# Patient Record
Sex: FEMALE | Race: OTHER | HISPANIC OR LATINO
[De-identification: names, ages, dates, MRNs, and addresses within clinical notes are randomized per-mention and may not be internally consistent; named-entity substitution may affect disease eponyms.]

---

## 2020-11-19 ENCOUNTER — APPOINTMENT (OUTPATIENT)
Dept: SURGERY | Facility: CLINIC | Age: 25
End: 2020-11-19
Payer: MEDICAID

## 2020-11-19 VITALS
TEMPERATURE: 97.7 F | BODY MASS INDEX: 30.97 KG/M2 | OXYGEN SATURATION: 99 % | HEART RATE: 83 BPM | WEIGHT: 177 LBS | DIASTOLIC BLOOD PRESSURE: 71 MMHG | HEIGHT: 63.5 IN | SYSTOLIC BLOOD PRESSURE: 115 MMHG

## 2020-11-19 DIAGNOSIS — K40.90 UNILATERAL INGUINAL HERNIA, W/OUT OBSTRUCTION OR GANGRENE, NOT SPECIFIED AS RECURRENT: ICD-10-CM

## 2020-11-19 PROCEDURE — 99204 OFFICE O/P NEW MOD 45 MIN: CPT

## 2020-11-19 NOTE — PHYSICAL EXAM
[JVD] : no jugular venous distention  [de-identified] : No acute distress [de-identified] : No respiratory distress [de-identified] : Regular rate [de-identified] : abdomen soft. no palpable inguinal hernia defect on exam, mild discomfort on palpation in right lower quadrant but no focal tenderness, no rebound or guarding

## 2020-11-19 NOTE — ASSESSMENT
[FreeTextEntry1] :  is a 25 year old woman with right inguinal hernia visualized on diagnostic laparoscopy with right lower abdominal/groin discomfort. We discussed the options of robotic/laparoscopic repair, open repair, and nonoperative management. The risks/benefits and alternatives were discussed at length and all questions were answered. We discussed the risks and benefits of the use of mesh. The patient appears to understand and wishes to proceed with robotic/laparoscopic right inguinal hernia repair with mesh. We discussed that the hernia may not be responsible for her pain and that repair will not guarantee resolution of her pain. She understands and agrees. \par

## 2020-11-19 NOTE — HISTORY OF PRESENT ILLNESS
[de-identified] :  is a 25 year old woman with right inguinal hernia visualized on recent diagnostic laparoscopy performed by GYN at [facility] for ectopic pregnancy who presents for evaluation. Reports intermittent right groin pain, especially at end of day and with activity. No incarceration. Denies fever/chills/nausea/emesis or changes in bowel or bladder habits. Tolerating diet. Normal bowel movements.\par \par Patient shared intraoperative image that was shared on her phone - small right inguinal hernia defect visible

## 2020-12-10 ENCOUNTER — OUTPATIENT (OUTPATIENT)
Dept: OUTPATIENT SERVICES | Facility: HOSPITAL | Age: 25
LOS: 1 days | End: 2020-12-10
Payer: MEDICAID

## 2020-12-10 VITALS
WEIGHT: 181.22 LBS | RESPIRATION RATE: 20 BRPM | DIASTOLIC BLOOD PRESSURE: 81 MMHG | HEIGHT: 64 IN | SYSTOLIC BLOOD PRESSURE: 113 MMHG | HEART RATE: 79 BPM | TEMPERATURE: 98 F

## 2020-12-10 DIAGNOSIS — K40.90 UNILATERAL INGUINAL HERNIA, WITHOUT OBSTRUCTION OR GANGRENE, NOT SPECIFIED AS RECURRENT: ICD-10-CM

## 2020-12-10 DIAGNOSIS — Z01.818 ENCOUNTER FOR OTHER PREPROCEDURAL EXAMINATION: ICD-10-CM

## 2020-12-10 DIAGNOSIS — Z98.890 OTHER SPECIFIED POSTPROCEDURAL STATES: Chronic | ICD-10-CM

## 2020-12-10 DIAGNOSIS — Z29.9 ENCOUNTER FOR PROPHYLACTIC MEASURES, UNSPECIFIED: ICD-10-CM

## 2020-12-10 LAB
ANION GAP SERPL CALC-SCNC: 9 MMOL/L — SIGNIFICANT CHANGE UP (ref 5–17)
APTT BLD: 33.8 SEC — SIGNIFICANT CHANGE UP (ref 27.5–35.5)
BASOPHILS # BLD AUTO: 0.06 K/UL — SIGNIFICANT CHANGE UP (ref 0–0.2)
BASOPHILS NFR BLD AUTO: 0.8 % — SIGNIFICANT CHANGE UP (ref 0–2)
BLD GP AB SCN SERPL QL: SIGNIFICANT CHANGE UP
BUN SERPL-MCNC: 13 MG/DL — SIGNIFICANT CHANGE UP (ref 8–20)
CALCIUM SERPL-MCNC: 9.2 MG/DL — SIGNIFICANT CHANGE UP (ref 8.6–10.2)
CHLORIDE SERPL-SCNC: 105 MMOL/L — SIGNIFICANT CHANGE UP (ref 98–107)
CO2 SERPL-SCNC: 26 MMOL/L — SIGNIFICANT CHANGE UP (ref 22–29)
CREAT SERPL-MCNC: 0.53 MG/DL — SIGNIFICANT CHANGE UP (ref 0.5–1.3)
EOSINOPHIL # BLD AUTO: 0.23 K/UL — SIGNIFICANT CHANGE UP (ref 0–0.5)
EOSINOPHIL NFR BLD AUTO: 3.2 % — SIGNIFICANT CHANGE UP (ref 0–6)
GLUCOSE SERPL-MCNC: 90 MG/DL — SIGNIFICANT CHANGE UP (ref 70–99)
HCT VFR BLD CALC: 40.7 % — SIGNIFICANT CHANGE UP (ref 34.5–45)
HGB BLD-MCNC: 13.3 G/DL — SIGNIFICANT CHANGE UP (ref 11.5–15.5)
IMM GRANULOCYTES NFR BLD AUTO: 0.1 % — SIGNIFICANT CHANGE UP (ref 0–1.5)
INR BLD: 1.06 RATIO — SIGNIFICANT CHANGE UP (ref 0.88–1.16)
LYMPHOCYTES # BLD AUTO: 3.26 K/UL — SIGNIFICANT CHANGE UP (ref 1–3.3)
LYMPHOCYTES # BLD AUTO: 45.7 % — HIGH (ref 13–44)
MCHC RBC-ENTMCNC: 30 PG — SIGNIFICANT CHANGE UP (ref 27–34)
MCHC RBC-ENTMCNC: 32.7 GM/DL — SIGNIFICANT CHANGE UP (ref 32–36)
MCV RBC AUTO: 91.9 FL — SIGNIFICANT CHANGE UP (ref 80–100)
MONOCYTES # BLD AUTO: 0.41 K/UL — SIGNIFICANT CHANGE UP (ref 0–0.9)
MONOCYTES NFR BLD AUTO: 5.8 % — SIGNIFICANT CHANGE UP (ref 2–14)
MRSA PCR RESULT.: SIGNIFICANT CHANGE UP
NEUTROPHILS # BLD AUTO: 3.16 K/UL — SIGNIFICANT CHANGE UP (ref 1.8–7.4)
NEUTROPHILS NFR BLD AUTO: 44.4 % — SIGNIFICANT CHANGE UP (ref 43–77)
PLATELET # BLD AUTO: 233 K/UL — SIGNIFICANT CHANGE UP (ref 150–400)
POTASSIUM SERPL-MCNC: 4 MMOL/L — SIGNIFICANT CHANGE UP (ref 3.5–5.3)
POTASSIUM SERPL-SCNC: 4 MMOL/L — SIGNIFICANT CHANGE UP (ref 3.5–5.3)
PROTHROM AB SERPL-ACNC: 12.3 SEC — SIGNIFICANT CHANGE UP (ref 10.6–13.6)
RBC # BLD: 4.43 M/UL — SIGNIFICANT CHANGE UP (ref 3.8–5.2)
RBC # FLD: 12.4 % — SIGNIFICANT CHANGE UP (ref 10.3–14.5)
S AUREUS DNA NOSE QL NAA+PROBE: DETECTED
SODIUM SERPL-SCNC: 140 MMOL/L — SIGNIFICANT CHANGE UP (ref 135–145)
WBC # BLD: 7.13 K/UL — SIGNIFICANT CHANGE UP (ref 3.8–10.5)
WBC # FLD AUTO: 7.13 K/UL — SIGNIFICANT CHANGE UP (ref 3.8–10.5)

## 2020-12-10 PROCEDURE — G0463: CPT

## 2020-12-10 RX ORDER — CEFAZOLIN SODIUM 1 G
2000 VIAL (EA) INJECTION ONCE
Refills: 0 | Status: DISCONTINUED | OUTPATIENT
Start: 2020-12-21 | End: 2021-01-04

## 2020-12-10 NOTE — H&P PST ADULT - NSANTHOSAYNRD_GEN_A_CORE
No. MAYITO screening performed.  STOP BANG Legend: 0-2 = LOW Risk; 3-4 = INTERMEDIATE Risk; 5-8 = HIGH Risk

## 2020-12-10 NOTE — H&P PST ADULT - LAB RESULTS AND INTERPRETATION
MSSA detected, treatment sent to pharmacy on file, pt called today 12/14/2020 and provide with results and treatment instructions

## 2020-12-10 NOTE — ASU PATIENT PROFILE, ADULT - LEARNING ASSESSMENT (PATIENT) ADDITIONAL COMMENTS
Pre op teaching surgical scrub pain management instructions given to pt Covid swab to be done Dec 18

## 2020-12-10 NOTE — H&P PST ADULT - NSICDXPASTMEDICALHX_GEN_ALL_CORE_FT
PAST MEDICAL HISTORY:  Anemia     Ectopic pregnancy     Unilateral or unspecified inguinal hernia without obstruction or gangrene

## 2020-12-10 NOTE — H&P PST ADULT - NSICDXFAMILYHX_GEN_ALL_CORE_FT
FAMILY HISTORY:  Sibling  Still living? Yes, Estimated age: Age Unknown  FH: asthma, Age at diagnosis: Age Unknown

## 2020-12-10 NOTE — ASU PATIENT PROFILE, ADULT - NS PRO LACT YNNA
What Type Of Note Output Would You Prefer (Optional)?: Standard Output Hpi Title: Evaluation of Skin Lesions How Severe Are Your Spot(S)?: mild Have Your Spot(S) Been Treated In The Past?: has not been treated Family Member: Father no

## 2020-12-10 NOTE — H&P PST ADULT - ASSESSMENT
24 y/o female with anemia, lumbar herniated disc, right inguinal hernia seen today pre-op for Robotic assisted right inguinal hernia repair with MESH for unilateral inguinal hernia, without obstruction or gangrene. Pt report right lower quadrant abdominal, right  groin pain and right groin swelling that has progressively gotten worse within the past few months. Pt report she was seen by her Ob-gyn, underwent imaging that revealed inguinal hernia and referred to Dr. Mcculre by her Ob-gyn. 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 Seen today for a scheduled surgery on 2020. surgery protocol reviewed with pt today.  CAPRINI VTE 2.0 SCORE [CLOT updated 2019]    AGE RELATED RISK FACTORS                                                       MOBILITY RELATED FACTORS  [ ] Age 41-60 years                                            (1 Point)                    [ ] Bed rest                                                        (1 Point)  [ ] Age: 61-74 years                                           (2 Points)                  [ ] Plaster cast                                                   (2 Points)  [ ] Age= 75 years                                              (3 Points)                    [ ] Bed bound for more than 72 hours                 (2 Points)    DISEASE RELATED RISK FACTORS                                               GENDER SPECIFIC FACTORS  [ ] Edema in the lower extremities                       (1 Point)              [ ] Pregnancy                                                     (1 Point)  [ ] Varicose veins                                               (1 Point)                     [ ] Post-partum < 6 weeks                                   (1 Point)             [x ] BMI > 25 Kg/m2                                            (1 Point)                     [ ] Hormonal therapy  or oral contraception          (1 Point)                 [ ] Sepsis (in the previous month)                        (1 Point)               [ ] History of pregnancy complications                 (1 point)  [ ] Pneumonia or serious lung disease                                               [ ] Unexplained or recurrent                     (1 Point)           (in the previous month)                               (1 Point)  [ ] Abnormal pulmonary function test                     (1 Point)                 SURGERY RELATED RISK FACTORS  [ ] Acute myocardial infarction                              (1 Point)               [ ]  Section                                             (1 Point)  [ ] Congestive heart failure (in the previous month)  (1 Point)      [ ] Minor surgery                                                  (1 Point)   [ ] Inflammatory bowel disease                             (1 Point)               [ ] Arthroscopic surgery                                        (2 Points)  [ ] Central venous access                                      (2 Points)                [x ] General surgery lasting more than 45 minutes (2 points)  [ ] Malignancy- Present or previous                   (2 Points)                [ ] Elective arthroplasty                                         (5 points)    [ ] Stroke (in the previous month)                          (5 Points)                                                                                                                                                           HEMATOLOGY RELATED FACTORS                                                 TRAUMA RELATED RISK FACTORS  [ ] Prior episodes of VTE                                     (3 Points)                [ ] Fracture of the hip, pelvis, or leg                       (5 Points)  [ ] Positive family history for VTE                         (3 Points)             [ ] Acute spinal cord injury (in the previous month)  (5 Points)  [ ] Prothrombin 95825 A                                     (3 Points)               [ ] Paralysis  (less than 1 month)                             (5 Points)  [ ] Factor V Leiden                                             (3 Points)                  [ ] Multiple Trauma within 1 month                        (5 Points)  [ ] Lupus anticoagulants                                     (3 Points)                                                           [ ] Anticardiolipin antibodies                               (3 Points)                                                       [ ] High homocysteine in the blood                      (3 Points)                                             [ ] Other congenital or acquired thrombophilia      (3 Points)                                                [ ] Heparin induced thrombocytopenia                  (3 Points)                                     Total Score [       3   ]  OPIOID RISK TOOL    THONY EACH BOX THAT APPLIES AND ADD TOTALS AT THE END    FAMILY HISTORY OF SUBSTANCE ABUSE                 FEMALE         MALE                                                Alcohol                             [  ]1 pt          [  ]3pts                                               Illegal Durgs                     [  ]2 pts        [  ]3pts                                               Rx Drugs                           [  ]4 pts        [  ]4 pts    PERSONAL HISTORY OF SUBSTANCE ABUSE                                                                                          Alcohol                             [  ]3 pts       [  ]3 pts                                               Illegal Drugs                     [  ]4 pts        [  ]4 pts                                               Rx Drugs                           [  ]5 pts        [  ]5 pts    AGE BETWEEN 16-45 YEARS                                      [  ]1 pt         [  ]1 pt    HISTORY OF PREADOLESCENT   SEXUAL ABUSE                                                             [  ]3 pts        [  ]0pts    PSYCHOLOGICAL DISEASE                     ADD, OCD, Bipolar, Schizophrenia        [  ]2 pts         [  ]2 pts                      Depression                                               [  ]1 pt           [  ]1 pt           SCORING TOTAL   (add numbers and type here)              (**0*)                                     A score of 3 or lower indicated LOW risk for future opioid abuse  A score of 4 to 7 indicated moderate risk for future opioid abuse  A score of 8 or higher indicates a high risk for opioid abuse

## 2020-12-10 NOTE — H&P PST ADULT - HISTORY OF PRESENT ILLNESS
24 y/o female with anemia, lumbar herniated disc, right inguinal hernia seen today pre-op for Robotic assisted right inguinal hernia repair with MESH for unilateral inguinal hernia, without obstruction or gangrene. Pt report right lower quadrant abdominal, right  groin pain and right groin swelling that has progressively gotten worse within the past few months. Pt report she was seen by her Ob-gyn, underwent imaging that revealed inguinal hernia and referred to Dr. Mcclure by her Ob-gyn. 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 Seen today for a scheduled surgery on 12/21/2020

## 2020-12-10 NOTE — H&P PST ADULT - LANGUAGE ASSISTANCE NEEDED
Pt understand and speak English Language/No-Patient/Caregiver offered and refused free interpretation services.

## 2020-12-10 NOTE — H&P PST ADULT - ATTENDING COMMENTS
No change to patient condition  Plan for robotic right inguinal hernia repair with mesh, possible open  Risks/benefits discussed with patient. She understands, agrees, and wishes to proceed. All questions answered.

## 2020-12-14 RX ORDER — MUPIROCIN 20 MG/G
1 OINTMENT TOPICAL
Qty: 1 | Refills: 0
Start: 2020-12-14 | End: 2020-12-18

## 2020-12-16 DIAGNOSIS — Z01.818 ENCOUNTER FOR OTHER PREPROCEDURAL EXAMINATION: ICD-10-CM

## 2020-12-18 ENCOUNTER — APPOINTMENT (OUTPATIENT)
Dept: DISASTER EMERGENCY | Facility: CLINIC | Age: 25
End: 2020-12-18

## 2020-12-19 LAB — SARS-COV-2 N GENE NPH QL NAA+PROBE: NOT DETECTED

## 2020-12-20 ENCOUNTER — TRANSCRIPTION ENCOUNTER (OUTPATIENT)
Age: 25
End: 2020-12-20

## 2020-12-21 ENCOUNTER — OUTPATIENT (OUTPATIENT)
Dept: OUTPATIENT SERVICES | Facility: HOSPITAL | Age: 25
LOS: 1 days | End: 2020-12-21
Payer: MEDICAID

## 2020-12-21 ENCOUNTER — APPOINTMENT (OUTPATIENT)
Dept: SURGERY | Facility: HOSPITAL | Age: 25
End: 2020-12-21

## 2020-12-21 VITALS
RESPIRATION RATE: 18 BRPM | OXYGEN SATURATION: 100 % | SYSTOLIC BLOOD PRESSURE: 108 MMHG | HEART RATE: 79 BPM | DIASTOLIC BLOOD PRESSURE: 64 MMHG

## 2020-12-21 VITALS
SYSTOLIC BLOOD PRESSURE: 108 MMHG | WEIGHT: 181.22 LBS | DIASTOLIC BLOOD PRESSURE: 67 MMHG | RESPIRATION RATE: 15 BRPM | TEMPERATURE: 98 F | HEIGHT: 64 IN | HEART RATE: 87 BPM | OXYGEN SATURATION: 100 %

## 2020-12-21 DIAGNOSIS — Z98.890 OTHER SPECIFIED POSTPROCEDURAL STATES: Chronic | ICD-10-CM

## 2020-12-21 DIAGNOSIS — K40.90 UNILATERAL INGUINAL HERNIA, WITHOUT OBSTRUCTION OR GANGRENE, NOT SPECIFIED AS RECURRENT: ICD-10-CM

## 2020-12-21 PROCEDURE — S2900: CPT

## 2020-12-21 PROCEDURE — 49650 LAP ING HERNIA REPAIR INIT: CPT

## 2020-12-21 PROCEDURE — C1781: CPT

## 2020-12-21 PROCEDURE — S2900 ROBOTIC SURGICAL SYSTEM: CPT

## 2020-12-21 PROCEDURE — 49650 LAP ING HERNIA REPAIR INIT: CPT | Mod: RT

## 2020-12-21 RX ORDER — POTASSIUM CHLORIDE 20 MEQ
1 PACKET (EA) ORAL
Qty: 0 | Refills: 0 | DISCHARGE

## 2020-12-21 RX ORDER — OMEGA-3 ACID ETHYL ESTERS 1 G
0 CAPSULE ORAL
Qty: 0 | Refills: 0 | DISCHARGE

## 2020-12-21 RX ORDER — BUPIVACAINE 13.3 MG/ML
20 INJECTION, SUSPENSION, LIPOSOMAL INFILTRATION ONCE
Refills: 0 | Status: DISCONTINUED | OUTPATIENT
Start: 2020-12-21 | End: 2020-12-21

## 2020-12-21 RX ORDER — POLYETHYLENE GLYCOL 3350 17 G/17G
0 POWDER, FOR SOLUTION ORAL
Qty: 0 | Refills: 0 | DISCHARGE

## 2020-12-21 RX ORDER — ACETAMINOPHEN 500 MG
975 TABLET ORAL ONCE
Refills: 0 | Status: COMPLETED | OUTPATIENT
Start: 2020-12-21 | End: 2020-12-21

## 2020-12-21 RX ORDER — SODIUM CHLORIDE 9 MG/ML
1000 INJECTION, SOLUTION INTRAVENOUS
Refills: 0 | Status: DISCONTINUED | OUTPATIENT
Start: 2020-12-21 | End: 2020-12-21

## 2020-12-21 RX ORDER — TRAMADOL HYDROCHLORIDE 50 MG/1
0.5 TABLET ORAL
Qty: 12 | Refills: 0
Start: 2020-12-21

## 2020-12-21 RX ORDER — CELECOXIB 200 MG/1
400 CAPSULE ORAL ONCE
Refills: 0 | Status: COMPLETED | OUTPATIENT
Start: 2020-12-21 | End: 2020-12-21

## 2020-12-21 RX ORDER — FERROUS SULFATE 325(65) MG
1 TABLET ORAL
Qty: 0 | Refills: 0 | DISCHARGE

## 2020-12-21 RX ORDER — ASCORBIC ACID 60 MG
0 TABLET,CHEWABLE ORAL
Qty: 0 | Refills: 0 | DISCHARGE

## 2020-12-21 RX ORDER — HYDROMORPHONE HYDROCHLORIDE 2 MG/ML
1 INJECTION INTRAMUSCULAR; INTRAVENOUS; SUBCUTANEOUS
Refills: 0 | Status: DISCONTINUED | OUTPATIENT
Start: 2020-12-21 | End: 2020-12-21

## 2020-12-21 RX ORDER — SODIUM CHLORIDE 9 MG/ML
3 INJECTION INTRAMUSCULAR; INTRAVENOUS; SUBCUTANEOUS ONCE
Refills: 0 | Status: COMPLETED | OUTPATIENT
Start: 2020-12-21 | End: 2020-12-21

## 2020-12-21 RX ORDER — GABAPENTIN 400 MG/1
300 CAPSULE ORAL ONCE
Refills: 0 | Status: COMPLETED | OUTPATIENT
Start: 2020-12-21 | End: 2020-12-21

## 2020-12-21 RX ADMIN — GABAPENTIN 300 MILLIGRAM(S): 400 CAPSULE ORAL at 06:26

## 2020-12-21 RX ADMIN — HYDROMORPHONE HYDROCHLORIDE 1 MILLIGRAM(S): 2 INJECTION INTRAMUSCULAR; INTRAVENOUS; SUBCUTANEOUS at 09:43

## 2020-12-21 RX ADMIN — SODIUM CHLORIDE 3 MILLILITER(S): 9 INJECTION INTRAMUSCULAR; INTRAVENOUS; SUBCUTANEOUS at 06:28

## 2020-12-21 RX ADMIN — HYDROMORPHONE HYDROCHLORIDE 1 MILLIGRAM(S): 2 INJECTION INTRAMUSCULAR; INTRAVENOUS; SUBCUTANEOUS at 09:38

## 2020-12-21 RX ADMIN — Medication 975 MILLIGRAM(S): at 06:26

## 2020-12-21 RX ADMIN — CELECOXIB 400 MILLIGRAM(S): 200 CAPSULE ORAL at 06:26

## 2020-12-21 NOTE — ASU DISCHARGE PLAN (ADULT/PEDIATRIC) - MEDICATION INSTRUCTIONS
Alternate Tylenol and Ibuprofen. Take 1000mg Tylenol every 8hours and alternate with 600-800 mg of Ibuprofen. You can take these medications together but it is better if you take Tylenol, wait 4hours and then take Ibuprofen. Do not drive if you have taken Tramadol.

## 2020-12-21 NOTE — BRIEF OPERATIVE NOTE - NSICDXBRIEFPROCEDURE_GEN_ALL_CORE_FT
PROCEDURES:  Repair, hernia, inguinal, robot-assisted, laparoscopic, using da Radha Si, with mesh insertion 21-Dec-2020 09:26:45  Светлана Bradford

## 2020-12-21 NOTE — ASU DISCHARGE PLAN (ADULT/PEDIATRIC) - COMMENTS
Please call today or tomorrow to make an appointment with the office within 1-3 weeks of your operation.

## 2020-12-21 NOTE — BRIEF OPERATIVE NOTE - OPERATION/FINDINGS
Pre-peritoneal dissection, indirect inguinal hernia identified, hernia sac along with round ligament taken down.

## 2020-12-23 PROBLEM — D64.9 ANEMIA, UNSPECIFIED: Chronic | Status: ACTIVE | Noted: 2020-12-10

## 2020-12-23 PROBLEM — M51.26 OTHER INTERVERTEBRAL DISC DISPLACEMENT, LUMBAR REGION: Chronic | Status: ACTIVE | Noted: 2020-12-10

## 2020-12-23 PROBLEM — O00.90 UNSPECIFIED ECTOPIC PREGNANCY WITHOUT INTRAUTERINE PREGNANCY: Chronic | Status: ACTIVE | Noted: 2020-12-10

## 2020-12-23 PROBLEM — K40.90 UNILATERAL INGUINAL HERNIA, WITHOUT OBSTRUCTION OR GANGRENE, NOT SPECIFIED AS RECURRENT: Chronic | Status: ACTIVE | Noted: 2020-12-10

## 2020-12-31 ENCOUNTER — APPOINTMENT (OUTPATIENT)
Dept: SURGERY | Facility: CLINIC | Age: 25
End: 2020-12-31
Payer: MEDICAID

## 2020-12-31 VITALS
SYSTOLIC BLOOD PRESSURE: 119 MMHG | DIASTOLIC BLOOD PRESSURE: 81 MMHG | TEMPERATURE: 97.9 F | WEIGHT: 185 LBS | HEIGHT: 63.5 IN | OXYGEN SATURATION: 98 % | HEART RATE: 90 BPM | BODY MASS INDEX: 32.37 KG/M2

## 2020-12-31 PROCEDURE — 99024 POSTOP FOLLOW-UP VISIT: CPT

## 2020-12-31 NOTE — ASSESSMENT
[FreeTextEntry1] : Ms. MARITZA RODRIGUEZ is a 25 year-old woman who presented with right inguinal hernia s/p robot-assisted right inguinal hernia repair with mesh insertion on 12/21/2020, doing well

## 2020-12-31 NOTE — PHYSICAL EXAM
[JVD] : no jugular venous distention  [de-identified] : No acute distress [de-identified] : No respiratory distress [de-identified] : Regular rate [de-identified] : abdomen soft, nontender. incisions c/d/i

## 2020-12-31 NOTE — HISTORY OF PRESENT ILLNESS
[de-identified] : Ms. MARITZA RODRIGUEZ is a 25 year-old woman who presented with right inguinal hernia s/p robot-assisted right inguinal hernia repair with mesh insertion on 12/21/2020, who comes in today for a post op visit. She is doing well. Denies pain. Denies fever/chills. No redness or pain at incision sites. Tolerating diet. Normal bowel movements.\par

## 2021-02-18 ENCOUNTER — EMERGENCY (EMERGENCY)
Facility: HOSPITAL | Age: 26
LOS: 1 days | Discharge: HOME | End: 2021-02-18
Attending: EMERGENCY MEDICINE
Payer: MEDICAID

## 2021-02-18 VITALS
SYSTOLIC BLOOD PRESSURE: 113 MMHG | OXYGEN SATURATION: 100 % | RESPIRATION RATE: 18 BRPM | HEART RATE: 74 BPM | TEMPERATURE: 98 F | DIASTOLIC BLOOD PRESSURE: 74 MMHG

## 2021-02-18 VITALS
OXYGEN SATURATION: 100 % | TEMPERATURE: 99 F | DIASTOLIC BLOOD PRESSURE: 70 MMHG | WEIGHT: 179.9 LBS | SYSTOLIC BLOOD PRESSURE: 120 MMHG | RESPIRATION RATE: 18 BRPM | HEIGHT: 64 IN | HEART RATE: 89 BPM

## 2021-02-18 DIAGNOSIS — Z98.890 OTHER SPECIFIED POSTPROCEDURAL STATES: Chronic | ICD-10-CM

## 2021-02-18 LAB
ALBUMIN SERPL ELPH-MCNC: 4.4 G/DL — SIGNIFICANT CHANGE UP (ref 3.3–5.2)
ALP SERPL-CCNC: 65 U/L — SIGNIFICANT CHANGE UP (ref 40–120)
ALT FLD-CCNC: 20 U/L — SIGNIFICANT CHANGE UP
ANION GAP SERPL CALC-SCNC: 12 MMOL/L — SIGNIFICANT CHANGE UP (ref 5–17)
APPEARANCE UR: CLEAR — SIGNIFICANT CHANGE UP
AST SERPL-CCNC: 19 U/L — SIGNIFICANT CHANGE UP
BACTERIA # UR AUTO: ABNORMAL
BASOPHILS # BLD AUTO: 0.05 K/UL — SIGNIFICANT CHANGE UP (ref 0–0.2)
BASOPHILS NFR BLD AUTO: 0.5 % — SIGNIFICANT CHANGE UP (ref 0–2)
BILIRUB SERPL-MCNC: <0.2 MG/DL — LOW (ref 0.4–2)
BILIRUB UR-MCNC: NEGATIVE — SIGNIFICANT CHANGE UP
BUN SERPL-MCNC: 11 MG/DL — SIGNIFICANT CHANGE UP (ref 8–20)
CALCIUM SERPL-MCNC: 9.3 MG/DL — SIGNIFICANT CHANGE UP (ref 8.6–10.2)
CHLORIDE SERPL-SCNC: 105 MMOL/L — SIGNIFICANT CHANGE UP (ref 98–107)
CO2 SERPL-SCNC: 24 MMOL/L — SIGNIFICANT CHANGE UP (ref 22–29)
COLOR SPEC: YELLOW — SIGNIFICANT CHANGE UP
CREAT SERPL-MCNC: 0.57 MG/DL — SIGNIFICANT CHANGE UP (ref 0.5–1.3)
DIFF PNL FLD: ABNORMAL
EOSINOPHIL # BLD AUTO: 0.3 K/UL — SIGNIFICANT CHANGE UP (ref 0–0.5)
EOSINOPHIL NFR BLD AUTO: 3.1 % — SIGNIFICANT CHANGE UP (ref 0–6)
EPI CELLS # UR: SIGNIFICANT CHANGE UP
GLUCOSE SERPL-MCNC: 90 MG/DL — SIGNIFICANT CHANGE UP (ref 70–99)
GLUCOSE UR QL: NEGATIVE MG/DL — SIGNIFICANT CHANGE UP
HCG UR QL: NEGATIVE — SIGNIFICANT CHANGE UP
HCT VFR BLD CALC: 39 % — SIGNIFICANT CHANGE UP (ref 34.5–45)
HGB BLD-MCNC: 12.9 G/DL — SIGNIFICANT CHANGE UP (ref 11.5–15.5)
IMM GRANULOCYTES NFR BLD AUTO: 0.2 % — SIGNIFICANT CHANGE UP (ref 0–1.5)
KETONES UR-MCNC: NEGATIVE — SIGNIFICANT CHANGE UP
LEUKOCYTE ESTERASE UR-ACNC: ABNORMAL
LIDOCAIN IGE QN: 22 U/L — SIGNIFICANT CHANGE UP (ref 22–51)
LYMPHOCYTES # BLD AUTO: 4.34 K/UL — HIGH (ref 1–3.3)
LYMPHOCYTES # BLD AUTO: 45.2 % — HIGH (ref 13–44)
MCHC RBC-ENTMCNC: 29.7 PG — SIGNIFICANT CHANGE UP (ref 27–34)
MCHC RBC-ENTMCNC: 33.1 GM/DL — SIGNIFICANT CHANGE UP (ref 32–36)
MCV RBC AUTO: 89.7 FL — SIGNIFICANT CHANGE UP (ref 80–100)
MONOCYTES # BLD AUTO: 0.58 K/UL — SIGNIFICANT CHANGE UP (ref 0–0.9)
MONOCYTES NFR BLD AUTO: 6 % — SIGNIFICANT CHANGE UP (ref 2–14)
NEUTROPHILS # BLD AUTO: 4.31 K/UL — SIGNIFICANT CHANGE UP (ref 1.8–7.4)
NEUTROPHILS NFR BLD AUTO: 45 % — SIGNIFICANT CHANGE UP (ref 43–77)
NITRITE UR-MCNC: NEGATIVE — SIGNIFICANT CHANGE UP
PH UR: 7 — SIGNIFICANT CHANGE UP (ref 5–8)
PLATELET # BLD AUTO: 248 K/UL — SIGNIFICANT CHANGE UP (ref 150–400)
POTASSIUM SERPL-MCNC: 3.6 MMOL/L — SIGNIFICANT CHANGE UP (ref 3.5–5.3)
POTASSIUM SERPL-SCNC: 3.6 MMOL/L — SIGNIFICANT CHANGE UP (ref 3.5–5.3)
PROT SERPL-MCNC: 7.3 G/DL — SIGNIFICANT CHANGE UP (ref 6.6–8.7)
PROT UR-MCNC: 15 MG/DL
RBC # BLD: 4.35 M/UL — SIGNIFICANT CHANGE UP (ref 3.8–5.2)
RBC # FLD: 12.4 % — SIGNIFICANT CHANGE UP (ref 10.3–14.5)
RBC CASTS # UR COMP ASSIST: ABNORMAL /HPF (ref 0–4)
SODIUM SERPL-SCNC: 141 MMOL/L — SIGNIFICANT CHANGE UP (ref 135–145)
SP GR SPEC: 1.01 — SIGNIFICANT CHANGE UP (ref 1.01–1.02)
UROBILINOGEN FLD QL: NEGATIVE MG/DL — SIGNIFICANT CHANGE UP
WBC # BLD: 9.6 K/UL — SIGNIFICANT CHANGE UP (ref 3.8–10.5)
WBC # FLD AUTO: 9.6 K/UL — SIGNIFICANT CHANGE UP (ref 3.8–10.5)
WBC UR QL: SIGNIFICANT CHANGE UP

## 2021-02-18 PROCEDURE — 85025 COMPLETE CBC W/AUTO DIFF WBC: CPT

## 2021-02-18 PROCEDURE — 36415 COLL VENOUS BLD VENIPUNCTURE: CPT

## 2021-02-18 PROCEDURE — 74177 CT ABD & PELVIS W/CONTRAST: CPT | Mod: 26,MA

## 2021-02-18 PROCEDURE — 80053 COMPREHEN METABOLIC PANEL: CPT

## 2021-02-18 PROCEDURE — 83690 ASSAY OF LIPASE: CPT

## 2021-02-18 PROCEDURE — 81001 URINALYSIS AUTO W/SCOPE: CPT

## 2021-02-18 PROCEDURE — 81025 URINE PREGNANCY TEST: CPT

## 2021-02-18 PROCEDURE — 99285 EMERGENCY DEPT VISIT HI MDM: CPT

## 2021-02-18 PROCEDURE — 74177 CT ABD & PELVIS W/CONTRAST: CPT

## 2021-02-18 PROCEDURE — 99284 EMERGENCY DEPT VISIT MOD MDM: CPT | Mod: 25

## 2021-02-18 RX ORDER — IOHEXOL 300 MG/ML
30 INJECTION, SOLUTION INTRAVENOUS ONCE
Refills: 0 | Status: COMPLETED | OUTPATIENT
Start: 2021-02-18 | End: 2021-02-18

## 2021-02-18 RX ORDER — ACETAMINOPHEN 500 MG
975 TABLET ORAL ONCE
Refills: 0 | Status: COMPLETED | OUTPATIENT
Start: 2021-02-18 | End: 2021-02-18

## 2021-02-18 RX ADMIN — IOHEXOL 30 MILLILITER(S): 300 INJECTION, SOLUTION INTRAVENOUS at 22:00

## 2021-02-18 RX ADMIN — Medication 975 MILLIGRAM(S): at 22:00

## 2021-02-18 NOTE — ED STATDOCS - OBJECTIVE STATEMENT
She complain of intermittent right sided abdominal pain for over 1 year.  Pain worsens with movement and sexual activity.  Patient diagnosed with ectopic pregnancy last year requiring surgery.  She was also diagnosed with hernia.  Pain radiates down leg, unassociated with food or defecation or urination.  She denies pregnancy with a normal LMP in January.

## 2021-02-18 NOTE — ED STATDOCS - NS ED ROS FT
(GENERAL) denies pain with eating, chronic pain syndromes  (HEENT) denies epistaxis, gum bleeding  (RESPIRATORY) denies pain with breathing, hemoptysis   (CARDIAC) denies chest pain, upper back pain  (ABDOMEN) positive for abdominal pain, denies hematemesis, bloody stools  () denies dysuria, pregnancy, hematuria  (SKIN) denies bruising or bleeding  (MUSCULOSKELETAL) denies chronic muscle or joint pain   (NEURO) denies headache or back pain

## 2021-02-18 NOTE — ED STATDOCS - PROGRESS NOTE DETAILS
Alert and oriented to person, place, time/situation. normal mood and affect. no apparent risk to self or others. POLO- PT evaluated by intake physician. HPI/PE/ROS as noted above. Will follow up plan per intake physician POLO- ct reading showing normal appenidx, liekly enterities, inflammatory in nature, lower suspicion of infectious enteritis, will ahv ept f/u with gi, Pt reassessed, pt feeling better at this time, vss, pt able to walk, talk and vocalized plan of action. Discussed in depth and explained to pt in depth the next steps that need to be taking including proper follow up with PCP or specialists. All incidental findings were discussed with pt as well. Pt verbalized their concerns and all questions were answered. Pt understands dispo and wants discharge. Given good instructions when to return to ED and importance of f/u.

## 2021-02-18 NOTE — ED STATDOCS - ATTENDING CONTRIBUTION TO CARE
I have seen and evaluated this patient with the [facility] practice clinician.   I agree with the findings  unless other wise stated.  After my face to face bedside evaluation, I am noting: RLQ pain intermittent for over year, normal lab results, low suspicion for appendicitis

## 2021-02-18 NOTE — ED STATDOCS - PHYSICAL EXAMINATION
(GENERAL) This is a well developed, well nourished patient who is in no apparent distress.    (HEENT) There is no signs of trauma of the head. The head is normocephalic. The sclera is anicteric and the conjunctiva is pink  (RESPIRATORY)There is no stridor and the patient has a normal respiratory pattern.    (CARDIAC) The rate appears to be normal. There is no JVD. The mucous membranes are moist.  (ABDOMEN) Mild right lower abdominal pain, no guarding, no rebound.  () No CVAT  (SKIN) The skin is warm and dry.   (MUSCULOSKELETAL) The extremities are warm to the touch with good capillary refill and without edema.  (PSYCH) The patient is cooperative and pleasant.    (NEURO) There are no obvious focal deformities on neurologic examination.

## 2021-02-18 NOTE — ED ADULT NURSE NOTE - PMH
Anemia    Ectopic pregnancy    Lumbar herniated disc    Unilateral or unspecified inguinal hernia without obstruction or gangrene

## 2021-02-18 NOTE — ED ADULT NURSE NOTE - OBJECTIVE STATEMENT
Patient having RLQ abd pain had an ectopic back in Sept/Oct had surgery still had pain found hernia had repair in Nov, now still having pain unsure why.

## 2021-02-18 NOTE — ED STATDOCS - PATIENT PORTAL LINK FT
You can access the FollowMyHealth Patient Portal offered by [facility] by registering at the following website: [email or web address]. By joining [facility]’s FollowMyHealth portal, you will also be able to view your health information using other applications (apps) compatible with our system.

## 2021-02-18 NOTE — ED STATDOCS - CARE PROVIDER_API CALL
Amee Araya (DO)  Gastroenterology  [address], [address]  [place] [address]  Phone: [phone]  Fax: [phone]  Follow Up Time:

## 2021-02-18 NOTE — ED ADULT TRIAGE NOTE - CHIEF COMPLAINT QUOTE
patient states that she has right lower abdominal pain, had surgery for hernia in November, pain progressively getting worse

## 2021-06-30 NOTE — ASU PATIENT PROFILE, ADULT - BRADEN SCORE (IF 18 OR LESS ACTIVATE SKIN INJURY RISK INCREASED GUIDELINE), MLM
Physician notified regarding continued elevated blood pressure after PRN Hydralazine administration    23

## 2021-12-18 ENCOUNTER — TRANSCRIPTION ENCOUNTER (OUTPATIENT)
Age: 26
End: 2021-12-18

## 2022-02-15 ENCOUNTER — TRANSCRIPTION ENCOUNTER (OUTPATIENT)
Age: 27
End: 2022-02-15

## 2022-04-14 ENCOUNTER — TRANSCRIPTION ENCOUNTER (OUTPATIENT)
Age: 27
End: 2022-04-14

## 2022-07-26 ENCOUNTER — RESULT REVIEW (OUTPATIENT)
Age: 27
End: 2022-07-26

## 2022-08-02 ENCOUNTER — APPOINTMENT (OUTPATIENT)
Dept: RHEUMATOLOGY | Facility: CLINIC | Age: 27
End: 2022-08-02

## 2022-08-02 VITALS
TEMPERATURE: 97.2 F | WEIGHT: 190 LBS | OXYGEN SATURATION: 100 % | SYSTOLIC BLOOD PRESSURE: 115 MMHG | DIASTOLIC BLOOD PRESSURE: 78 MMHG | BODY MASS INDEX: 33.25 KG/M2 | HEART RATE: 93 BPM | HEIGHT: 63.5 IN

## 2022-08-02 DIAGNOSIS — M51.36 OTHER INTERVERTEBRAL DISC DEGENERATION, LUMBAR REGION: ICD-10-CM

## 2022-08-02 DIAGNOSIS — M54.16 RADICULOPATHY, LUMBAR REGION: ICD-10-CM

## 2022-08-02 PROCEDURE — 99203 OFFICE O/P NEW LOW 30 MIN: CPT

## 2022-08-02 NOTE — ASSESSMENT
[FreeTextEntry1] : 27 y/o female self-referred to rheumatology for back pain.\par Pt has been having low back pain w/ radiation down to r leg. Pt had XRs and MRI of L-spine (~1 month ago) showing lumbar radiculopathy.\par Pt has been given various neuropathic medications which has not helped or made her very sleepy so that she is unable to function.\par Pt had epidural injection and PT without any improvement. Pt is being planned for a second epidural injection. She was told by orthopedics that if it does not work, she must consider surgery. Pt comes to rheumatology for second opinion.\par MR L-spine with L4-L5 disc herniation and nerve compression\par \par Patient has lumbar nerve compression on imaging that correlates with her symptoms without any systemic symptoms or inflammatory arthritis. No concern for any autoimmune rheum disease.\par Discussed with pt at length about treatment of lumbar radiculopathy including oral/topical analgesics, pain therapies (yoga, jac chi, acupuncture, chiropractor, massage therapy, wax therapy, etc.), PT, epidural injections, surgeries.\par I do agree that pt has been undergoing all appropriate conservative therapies for her mechanical disease. Unless pt would like to continue to discuss different analgesic options, surgery may be the next appropriate step if pt's symptoms severe and affects her daily life. Pt should continue to discuss risks and benefits of surgery with orthopedics.\par No further workup or additional recommendations from rheumatology needed.\par \par - RTC PRN

## 2022-08-02 NOTE — HISTORY OF PRESENT ILLNESS
[FreeTextEntry1] : 27 y/o female self-referred to rheumatology for back pain.\par Pt has been having low back pain w/ radiation down to r leg. Pt had XRs and MRI of L-spine (~1 month ago) showing lumbar radiculopathy.\par Pt has been given various neuropathic medications which has not helped or made her very sleepy so that she is unable to function.\par Pt had epidural injection and PT without any improvement. Pt is being planned for a second epidural injection. She was told by orthopedics that if it does not work, she must consider surgery. Pt comes to rheumatology for second opinion.\par Pt denies other systemic symptoms including joint swelling, redness, warmth, fever, cough, SOB, nausea, vomiting, diarrhea, rash. ROS negative unless otherwise noted above.\par \par WORKUP:\par MR L-Spine (5/2022): Degenerative changes. Disc bulging and central/right central disc herniation of L4-L5 with mild narrowing of spinal canal and partial effacement of the descending L5 nerve roots at the lateral recess more so on the right. L central disc herniation at L5-S1 contacts the descending left worse than right S1 nerve roots at the lateral recess.\par

## 2022-09-27 ENCOUNTER — NON-APPOINTMENT (OUTPATIENT)
Age: 27
End: 2022-09-27

## 2022-10-17 NOTE — ASU PREOP CHECKLIST - BP NONINVASIVE DIASTOLIC (MM HG)
She is scheduled for surgery 10-25-22. She has applied for financial aid but has not heard from them yet, do we know how long this may take she will not be able to have the surgery if she does not get the financial aid.
67

## 2022-12-26 ENCOUNTER — NON-APPOINTMENT (OUTPATIENT)
Age: 27
End: 2022-12-26

## 2024-11-26 ENCOUNTER — NON-APPOINTMENT (OUTPATIENT)
Age: 29
End: 2024-11-26

## 2025-03-08 ENCOUNTER — NON-APPOINTMENT (OUTPATIENT)
Age: 30
End: 2025-03-08